# Patient Record
Sex: MALE | Race: WHITE
[De-identification: names, ages, dates, MRNs, and addresses within clinical notes are randomized per-mention and may not be internally consistent; named-entity substitution may affect disease eponyms.]

---

## 2020-07-10 ENCOUNTER — HOSPITAL ENCOUNTER (EMERGENCY)
Dept: HOSPITAL 97 - ER | Age: 50
Discharge: HOME | End: 2020-07-10
Payer: COMMERCIAL

## 2020-07-10 VITALS — DIASTOLIC BLOOD PRESSURE: 89 MMHG | SYSTOLIC BLOOD PRESSURE: 151 MMHG | TEMPERATURE: 99 F

## 2020-07-10 VITALS — OXYGEN SATURATION: 98 %

## 2020-07-10 DIAGNOSIS — U07.1: Primary | ICD-10-CM

## 2020-07-10 DIAGNOSIS — J98.8: ICD-10-CM

## 2020-07-10 DIAGNOSIS — Z88.6: ICD-10-CM

## 2020-07-10 PROCEDURE — 99284 EMERGENCY DEPT VISIT MOD MDM: CPT

## 2020-07-10 PROCEDURE — 71045 X-RAY EXAM CHEST 1 VIEW: CPT

## 2020-07-10 NOTE — RAD REPORT
EXAM DESCRIPTION:  Rebecca Single View7/10/2020 3:04 pm

 

CLINICAL HISTORY:  Cough

 

COMPARISON:  none

 

FINDINGS:   The lungs appear clear of acute infiltrate. The heart is normal size. Postsurgical change
s involve the chest

 

IMPRESSION:   No acute abnormalities displayed

## 2020-07-10 NOTE — EDPHYS
Physician Documentation                                                                           

 [facility]                                                                 

Name: Chriss Cooper                                                                          

Age: 50 yrs                                                                                       

Sex: Male                                                                                         

: 1970                                                                                   

MRN: [id number]                                                                                   

Arrival Date: 07/10/2020                                                                          

Time: 14:04                                                                                       

Account#: [id number]                                                                            

Bed 24                                                                                            

Private MD:                                                                                       

ED Physician Kobe Avalos                                                                       

HPI:                                                                                              

07/10                                                                                             

15:33 This 50 yrs old  Male presents to ER via Ambulatory with complaints of R/O     kb  

      COVID.                                                                                      

15:33 The patient or guardian reports cough, that is intermittent, described as mild, with no kb  

      sputum, difficulty breathing, flu symptoms, low-grade fever, myalgias. Onset: The           

      symptoms/episode began/occurred 1 week(s) ago. Severity of symptoms: At their worst the     

      symptoms were moderate, in the emergency department the symptoms are unchanged.             

      Modifying factors: The symptoms are alleviated by nothing, the symptoms are aggravated      

      by nothing. Associated signs and symptoms: Pertinent positives: fever, rhinorrhea,          

      Pertinent negatives: chest pain, diarrhea, ear ache, nausea, sore throat, vomiting. The     

      patient has not experienced similar symptoms in the past. The patient has not recently      

      seen a physician. Pt reports he was sent here to get a covid swab done. Has had "covid      

      symptoms" all week. Cough and dyspnea on exertion got worse yesterday. Has been on a        

      tug boat so unable to seek care prior to today.                                             

                                                                                                  

Historical:                                                                                       

- Allergies:                                                                                      

14:19 Ibuprofen;                                                                              sv  

14:19 Percocet;                                                                               sv  

- PMHx:                                                                                           

14:19 CAD;                                                                                    sv  

- PSHx:                                                                                           

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  

                                                                                                  

- Immunization history:: Adult Immunizations.                                                     

- Social history:: Smoking status: Patient denies any tobacco usage or history of.                

                                                                                                  

                                                                                                  

ROS:                                                                                              

15:31 Neck: Negative for injury, pain, and swelling, Cardiovascular: Negative for chest pain, kb  

      palpitations, and edema, Abdomen/GI: Negative for abdominal pain, nausea, vomiting,         

      diarrhea, and constipation, Back: Negative for injury and pain, : Negative for            

      injury, bleeding, discharge, and swelling, MS/Extremity: Negative for injury and            

      deformity, Skin: Negative for injury, rash, and discoloration.                              

15:31 Constitutional: Positive for body aches, chills, fatigue, fever, malaise.                   

15:31 ENT: Positive for rhinorrhea, sinus congestion.                                             

15:31 Respiratory: Positive for cough, dyspnea on exertion, Negative for hemoptysis,              

      orthopnea, pleurisy, sputum production, wheezing.                                           

15:31 Neuro: Positive for headache.                                                               

                                                                                                  

Exam:                                                                                             

15:31 Constitutional:  This is a well developed, well nourished patient who is awake, alert,  kb  

      and in no acute distress. Head/Face:  Normocephalic, atraumatic. ENT:  Nares patent. No     

      nasal discharge, no septal abnormalities noted.  Tympanic membranes are normal and          

      external auditory canals are clear.  Oropharynx with no redness, swelling, or masses,       

      exudates, or evidence of obstruction, uvula midline.  Mucous membranes moist. Neck:         

      Trachea midline, no thyromegaly or masses palpated, and no cervical lymphadenopathy.        

      Supple, full range of motion without nuchal rigidity, or vertebral point tenderness.        

      No Meningismus. Chest/axilla:  Normal chest wall appearance and motion.  Nontender with     

      no deformity.  No lesions are appreciated. Cardiovascular:  Regular rate and rhythm         

      with a normal S1 and S2.  No gallops, murmurs, or rubs.  Normal PMI, no JVD.  No pulse      

      deficits. Respiratory:  Lungs have equal breath sounds bilaterally, clear to                

      auscultation and percussion.  No rales, rhonchi or wheezes noted.  No increased work of     

      breathing, no retractions or nasal flaring. Abdomen/GI:  Soft, non-tender, with normal      

      bowel sounds.  No distension or tympany.  No guarding or rebound.  No evidence of           

      tenderness throughout. Back:  No spinal tenderness.  No costovertebral tenderness.          

      Full range of motion. Skin:  Warm, dry with normal turgor.  Normal color with no            

      rashes, no lesions, and no evidence of cellulitis. MS/ Extremity:  Pulses equal, no         

      cyanosis.  Neurovascular intact.  Full, normal range of motion. Neuro:  Awake and           

      alert, GCS 15, oriented to person, place, time, and situation.  Cranial nerves II-XII       

      grossly intact.  Motor strength 5/5 in all extremities.  Sensory grossly intact.            

      Cerebellar exam normal.  Normal gait.                                                       

                                                                                                  

Vital Signs:                                                                                      

14:19  / 98; Pulse 106; Resp 16; Pulse Ox 98% ; Weight 102.06 kg; Height 5 ft. 9 in.    sv  

      (175.26 cm);                                                                                

15:53  / 89; Pulse 89; Resp 16; Temp 99.0(O); Pulse Ox 98% ; Pain 0/10;                 ls4 

14:19 Body Mass Index 33.23 (102.06 kg, 175.26 cm)                                            sv  

                                                                                                  

MDM:                                                                                              

14:21 Patient medically screened.                                                             kb  

15:32 Data reviewed: vital signs, nurses notes. Data interpreted: Pulse oximetry: on room air kb  

      is 98 %. Interpretation: normal. Counseling: I had a detailed discussion with the           

      patient and/or guardian regarding: the historical points, exam findings, and any            

      diagnostic results supporting the discharge/admit diagnosis, radiology results, the         

      need for outpatient follow up, a family practitioner, to return to the emergency            

      department if symptoms worsen or persist or if there are any questions or concerns that     

      arise at home.                                                                              

                                                                                                  

07/10                                                                                             

14:26 Order name: COVID-19                                                                    kb  

07/10                                                                                             

14:26 Order name: Chest Single View XRAY; Complete Time: 15:25                                kb  

                                                                                                  

Administered Medications:                                                                         

No medications were administered                                                                  

                                                                                                  

                                                                                                  

Disposition:                                                                                      

16:26 Co-signature as Attending Physician, Kobe Avalos MD I agree with the assessment and   kdr 

      plan of care.                                                                               

                                                                                                  

Disposition:                                                                                      

07/10/20 15:30 Discharged to Home. Impression: Acute upper respiratory infection, unspecified.    

- Condition is Stable.                                                                            

- Discharge Instructions: Viral Respiratory Infection, Easy-To-Read, COVID-19.                    

- Prescriptions for Albuterol Sulfate 90 mcg/actuation - inhale 1-2 puff by INHALATION            

  route every 4-6 hours; 1 Inhaler.                                                               

- Medication Reconciliation Form, Thank You Letter, Antibiotic Education, Prescription            

  Opioid Use form.                                                                                

- Follow up: Emergency Department; When: As needed; Reason: Worsening of condition.               

  Follow up: Private Physician; When: 2 - 3 days; Reason: Recheck today's complaints,             

  Continuance of care, Re-evaluation by your physician.                                           

                                                                                                  

                                                                                                  

                                                                                                  

Signatures:                                                                                       

Dispatcher [employer]                           EDMS                                                 

Shonna Farias, Aleena Caldwell RN RN sv Rittger, Kevin, MD MD   kdr                                                  

Alondra Hollis RN                       RN   ls4                                                  

                                                                                                  

Corrections: (The following items were deleted from the chart)                                    

16:03 15:30 07/10/2020 15:30 Discharged to Home. Impression: Acute upper respiratory          ls4 

      infection, unspecified. Condition is Stable. Forms are Medication Reconciliation Form,      

      Thank You Letter, Antibiotic Education, Prescription Opioid Use. Follow up: Emergency       

      Department; When: As needed; Reason: Worsening of condition. Follow up: Private             

      Physician; When: 2 - 3 days; Reason: Recheck today's complaints, Continuance of care,       

      Re-evaluation by your physician. kb                                                         

                                                                                                  

**************************************************************************************************

## 2020-07-10 NOTE — ER
Nurse's Notes                                                                                     

 [facility]                                                                 

Name: Chriss Cooper                                                                          

Age: 50 yrs                                                                                       

Sex: Male                                                                                         

: 1970                                                                                   

MRN: [id number]                                                                                   

Arrival Date: 07/10/2020                                                                          

Time: 14:04                                                                                       

Account#: [id number]                                                                            

Bed 24                                                                                            

Private MD:                                                                                       

Diagnosis: Acute upper respiratory infection, unspecified                                         

                                                                                                  

Presentation:                                                                                     

07/10                                                                                             

14:16 Chief complaint: Patient states: sent here to get a COVID test, reports headache,       sv  

      congestion, fever TMax 101, chest tightness, cough x 1 week. SOB x 1 day. Coronavirus       

      screen: Surgical mask placed on patient. Patient moved to private room, placed in           

      contact and droplet isolation with eye protection until further assessment. Patient         

      reports a cough. Patient reports shortness of breath or difficulty breathing. Patient       

      reports a measured and/or subjective temperature greater than 100.4F. Patient denies        

      travel on a cruise ship or to a country the [facility] currently lists as an affected area.        

      Patient denies contact with known and/or suspected case of COVID-19. Ebola Screen: No       

      symptoms or risks identified at this time. Risk Assessment: Do you want to hurt             

      yourself or someone else? Patient reports no desire to harm self or others. Onset of        

      symptoms was July 3, 2020.                                                                  

14:16 Method Of Arrival: Ambulatory                                                           sv  

14:16 Acuity: ELROY 3                                                                           sv  

14:20 Initial Sepsis Screen: Does the patient meet any 2 criteria? HR > 90 bpm. No. Patient's sv  

      initial sepsis screen is negative. Does the patient have a suspected source of              

      infection? No. Patient's initial sepsis screen is negative.                                 

                                                                                                  

Triage Assessment:                                                                                

14:19 General: Appears in no apparent distress. comfortable, Behavior is calm, cooperative,   sv  

      appropriate for age. Pain: Complains of pain in anterior aspect of left upper chest and     

      left breast. Neuro: Level of Consciousness is awake, alert, obeys commands, Oriented to     

      person, place, time, situation, Gait is steady, Speech is normal. Respiratory: Airway       

      is patent Respiratory effort is even, unlabored, Respiratory pattern is regular,            

      symmetrical.                                                                                

                                                                                                  

Historical:                                                                                       

- Allergies:                                                                                      

14:19 Ibuprofen;                                                                              sv  

14:19 Percocet;                                                                               sv  

- PMHx:                                                                                           

14:19 CAD;                                                                                    sv  

- PSHx:                                                                                           

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  

                                                                                                  

- Immunization history:: Adult Immunizations.                                                     

- Social history:: Smoking status: Patient denies any tobacco usage or history of.                

                                                                                                  

                                                                                                  

Screenin:42 Abuse screen:. Nutritional screening: No deficits noted. Tuberculosis screening: No     ss  

      symptoms or risk factors identified. Fall Risk None identified.                             

                                                                                                  

Assessment:                                                                                       

14:20 General: Appears in no apparent distress. comfortable, Behavior is calm, cooperative.   ls4 

14:20 Pain: Denies pain. Neuro: No deficits noted. Cardiovascular: No deficits noted.         ls4 

      Respiratory: No deficits noted. Airway is patent Respiratory effort is even, unlabored,     

      Respiratory pattern is regular, Breath sounds are clear bilaterally. GI: No deficits        

      noted. : No deficits noted. No signs and/or symptoms were reported regarding the          

      genitourinary system. Derm: Skin is intact, Skin is dry, Skin is pink, warm \T\ dry.        

      Musculoskeletal: No deficits noted. No signs and/or symptoms reported regarding the         

      musculoskeletal system.                                                                     

                                                                                                  

Vital Signs:                                                                                      

14:19  / 98; Pulse 106; Resp 16; Pulse Ox 98% ; Weight 102.06 kg; Height 5 ft. 9 in.    sv  

      (175.26 cm);                                                                                

15:53  / 89; Pulse 89; Resp 16; Temp 99.0(O); Pulse Ox 98% ; Pain 0/10;                 ls4 

14:19 Body Mass Index 33.23 (102.06 kg, 175.26 cm)                                            sv  

                                                                                                  

ED Course:                                                                                        

14:04 Patient arrived in ED.                                                                  ag5 

14:06 [facility]                                                        kb  

14:06 Kobe Avalos MD is Attending Physician.                                              kb  

14:16 Arm band placed on.                                                                     sv  

14:18 Triage completed.                                                                       sv  

14:42 Becca Jonas, RN is Primary Nurse.                                                    ss  

15:04 Chest Single View XRAY In Process Unspecified.                                          EDMS

15:57 No provider procedures requiring assistance completed. EKG done, by ED staff, reviewed  ls4 

      by Kobe Avalos MD covid 19 swab sent. Patient did not have IV access during this          

      emergency room visit. Patient maintains SpO2 saturation greater than 95% on room air.       

                                                                                                  

Administered Medications:                                                                         

No medications were administered                                                                  

                                                                                                  

                                                                                                  

Outcome:                                                                                          

15:30 Discharge ordered by MD.                                                                kb  

16:03 Discharged to home ambulatory.                                                          ls4 

16:03 Condition: good                                                                             

16:03 Discharge instructions given to patient, family, Instructed on discharge instructions,      

      follow up and referral plans. medication usage, Demonstrated understanding of               

      instructions, follow-up care, medications.                                                  

16:03 Patient left the ED.                                                                    ls4 

                                                                                                  

Addendum:                                                                                         

2020                                                                                        

     10:49 Addendum: COVID-19 Result: Positive result giiven to ED physician to notify pt.         kandy narayanan

           Physician: Pola Arana MD Physician was able to contact pt and pt was notified of   

           positive COVID-19 swab result. Physician answered pt questions.                        

                                                                                                  

Signatures:                                                                                       

Dispatcher [employer]                           EDMS                                                 

Shonna Farias, CHICO-BEBE                 FNP-Shakira Tapia, RN                    RN   dm5                                                  

Aleena De La Torre RN                    RN   sv                                                   

Becca Jonas RN                      RN   ss                                                   

Alondra Hollis RN                       RN   ls4                                                  

Cynthia Barger                                ag5                                                  

                                                                                                  

Corrections: (The following items were deleted from the chart)                                    

07/10                                                                                             

14:20 14:19 102.06 kg; Height 5 ft. 9 in.; BMI: 33.2; sv                                      sv  

14:20 14:19 Pulse 106bpm; Resp 16bpm; Pulse Ox 98%; 102.06 kg; Height 5 ft. 9 in.; BMI: 33.2; sv  

      sv                                                                                          

                                                                                                  

**************************************************************************************************